# Patient Record
Sex: MALE | Race: BLACK OR AFRICAN AMERICAN | NOT HISPANIC OR LATINO
[De-identification: names, ages, dates, MRNs, and addresses within clinical notes are randomized per-mention and may not be internally consistent; named-entity substitution may affect disease eponyms.]

---

## 2022-08-30 ENCOUNTER — NON-APPOINTMENT (OUTPATIENT)
Age: 32
End: 2022-08-30

## 2025-02-10 ENCOUNTER — INPATIENT (INPATIENT)
Facility: HOSPITAL | Age: 35
LOS: 0 days | Discharge: HOME | DRG: 247 | End: 2025-02-11
Attending: SURGERY | Admitting: SURGERY
Payer: MEDICAID

## 2025-02-10 VITALS
SYSTOLIC BLOOD PRESSURE: 140 MMHG | OXYGEN SATURATION: 99 % | DIASTOLIC BLOOD PRESSURE: 78 MMHG | TEMPERATURE: 98 F | RESPIRATION RATE: 18 BRPM | HEART RATE: 72 BPM

## 2025-02-10 LAB
ALBUMIN SERPL ELPH-MCNC: 4.4 G/DL — SIGNIFICANT CHANGE UP (ref 3.5–5.2)
ALP SERPL-CCNC: 69 U/L — SIGNIFICANT CHANGE UP (ref 30–115)
ALT FLD-CCNC: 11 U/L — SIGNIFICANT CHANGE UP (ref 0–41)
ANION GAP SERPL CALC-SCNC: 11 MMOL/L — SIGNIFICANT CHANGE UP (ref 7–14)
AST SERPL-CCNC: 18 U/L — SIGNIFICANT CHANGE UP (ref 0–41)
BASOPHILS # BLD AUTO: 0.04 K/UL — SIGNIFICANT CHANGE UP (ref 0–0.2)
BASOPHILS NFR BLD AUTO: 0.8 % — SIGNIFICANT CHANGE UP (ref 0–1)
BILIRUB SERPL-MCNC: 0.9 MG/DL — SIGNIFICANT CHANGE UP (ref 0.2–1.2)
BUN SERPL-MCNC: 13 MG/DL — SIGNIFICANT CHANGE UP (ref 10–20)
CALCIUM SERPL-MCNC: 8.7 MG/DL — SIGNIFICANT CHANGE UP (ref 8.4–10.5)
CHLORIDE SERPL-SCNC: 108 MMOL/L — SIGNIFICANT CHANGE UP (ref 98–110)
CO2 SERPL-SCNC: 26 MMOL/L — SIGNIFICANT CHANGE UP (ref 17–32)
CREAT SERPL-MCNC: 0.9 MG/DL — SIGNIFICANT CHANGE UP (ref 0.7–1.5)
EGFR: 114 ML/MIN/1.73M2 — SIGNIFICANT CHANGE UP
EOSINOPHIL # BLD AUTO: 0.05 K/UL — SIGNIFICANT CHANGE UP (ref 0–0.7)
EOSINOPHIL NFR BLD AUTO: 1 % — SIGNIFICANT CHANGE UP (ref 0–8)
GLUCOSE SERPL-MCNC: 93 MG/DL — SIGNIFICANT CHANGE UP (ref 70–99)
HCT VFR BLD CALC: 40.7 % — LOW (ref 42–52)
HGB BLD-MCNC: 14.3 G/DL — SIGNIFICANT CHANGE UP (ref 14–18)
IMM GRANULOCYTES NFR BLD AUTO: 0 % — LOW (ref 0.1–0.3)
LACTATE SERPL-SCNC: 0.9 MMOL/L — SIGNIFICANT CHANGE UP (ref 0.7–2)
LIDOCAIN IGE QN: 48 U/L — SIGNIFICANT CHANGE UP (ref 7–60)
LYMPHOCYTES # BLD AUTO: 3.11 K/UL — SIGNIFICANT CHANGE UP (ref 1.2–3.4)
LYMPHOCYTES # BLD AUTO: 62.3 % — HIGH (ref 20.5–51.1)
MCHC RBC-ENTMCNC: 31.8 PG — HIGH (ref 27–31)
MCHC RBC-ENTMCNC: 35.1 G/DL — SIGNIFICANT CHANGE UP (ref 32–37)
MCV RBC AUTO: 90.4 FL — SIGNIFICANT CHANGE UP (ref 80–94)
MONOCYTES # BLD AUTO: 0.42 K/UL — SIGNIFICANT CHANGE UP (ref 0.1–0.6)
MONOCYTES NFR BLD AUTO: 8.4 % — SIGNIFICANT CHANGE UP (ref 1.7–9.3)
NEUTROPHILS # BLD AUTO: 1.37 K/UL — LOW (ref 1.4–6.5)
NEUTROPHILS NFR BLD AUTO: 27.5 % — LOW (ref 42.2–75.2)
NRBC # BLD: 0 /100 WBCS — SIGNIFICANT CHANGE UP (ref 0–0)
NRBC BLD-RTO: 0 /100 WBCS — SIGNIFICANT CHANGE UP (ref 0–0)
PLATELET # BLD AUTO: 253 K/UL — SIGNIFICANT CHANGE UP (ref 130–400)
PMV BLD: 9.4 FL — SIGNIFICANT CHANGE UP (ref 7.4–10.4)
POTASSIUM SERPL-MCNC: 4.3 MMOL/L — SIGNIFICANT CHANGE UP (ref 3.5–5)
POTASSIUM SERPL-SCNC: 4.3 MMOL/L — SIGNIFICANT CHANGE UP (ref 3.5–5)
PROT SERPL-MCNC: 6.5 G/DL — SIGNIFICANT CHANGE UP (ref 6–8)
RBC # BLD: 4.5 M/UL — LOW (ref 4.7–6.1)
RBC # FLD: 11.8 % — SIGNIFICANT CHANGE UP (ref 11.5–14.5)
SODIUM SERPL-SCNC: 145 MMOL/L — SIGNIFICANT CHANGE UP (ref 135–146)
WBC # BLD: 4.99 K/UL — SIGNIFICANT CHANGE UP (ref 4.8–10.8)
WBC # FLD AUTO: 4.99 K/UL — SIGNIFICANT CHANGE UP (ref 4.8–10.8)

## 2025-02-10 PROCEDURE — 99285 EMERGENCY DEPT VISIT HI MDM: CPT

## 2025-02-10 PROCEDURE — 74177 CT ABD & PELVIS W/CONTRAST: CPT | Mod: 26

## 2025-02-10 RX ORDER — SODIUM CHLORIDE 9 G/ML
1000 INJECTION, SOLUTION INTRAVENOUS ONCE
Refills: 0 | Status: COMPLETED | OUTPATIENT
Start: 2025-02-10 | End: 2025-02-10

## 2025-02-10 RX ORDER — IOHEXOL 350 MG/ML
30 INJECTION, SOLUTION INTRAVENOUS ONCE
Refills: 0 | Status: COMPLETED | OUTPATIENT
Start: 2025-02-10 | End: 2025-02-10

## 2025-02-10 RX ORDER — MORPHINE SULFATE 60 MG/1
4 TABLET, FILM COATED, EXTENDED RELEASE ORAL ONCE
Refills: 0 | Status: DISCONTINUED | OUTPATIENT
Start: 2025-02-10 | End: 2025-02-10

## 2025-02-10 RX ADMIN — SODIUM CHLORIDE 1000 MILLILITER(S): 9 INJECTION, SOLUTION INTRAVENOUS at 21:40

## 2025-02-10 RX ADMIN — MORPHINE SULFATE 4 MILLIGRAM(S): 60 TABLET, FILM COATED, EXTENDED RELEASE ORAL at 22:03

## 2025-02-10 RX ADMIN — IOHEXOL 30 MILLILITER(S): 350 INJECTION, SOLUTION INTRAVENOUS at 21:30

## 2025-02-10 NOTE — ED PROVIDER NOTE - OBJECTIVE STATEMENT
34 year old male, past medical history appendectomy c/b bowel obstruction, who presents with abd pain. patient with diffuse abd pain that began earlier this evening with nausea no vomiting and constipation. denies f/c, chest pain, shortness of breath, urinary symptoms.

## 2025-02-10 NOTE — ED PROVIDER NOTE - PHYSICAL EXAMINATION
CONSTITUTIONAL: non-toxic appearing male, nad  SKIN: skin exam is warm and dry  ENT: MMM   CARD: S1, S2 normal, no murmur  RESP: No wheezes, rales or rhonchi. Good air movement bilaterally  ABD: soft; non-distended; non-tender.    EXT: Normal ROM.   NEURO: awake, alert, following commands, oriented, grossly unremarkable. No Focal deficits. GCS 15.   PSYCH: Cooperative, appropriate.

## 2025-02-10 NOTE — ED PROVIDER NOTE - CARE PLAN
Assessment and plan of treatment:	ab pain  labs, imaging, supportive care   1 Principal Discharge DX:	Small bowel obstruction  Assessment and plan of treatment:	ab pain  labs, imaging, supportive care

## 2025-02-10 NOTE — ED PROVIDER NOTE - ATTENDING APP SHARED VISIT CONTRIBUTION OF CARE
pt co mild ab discomfort, hx of appy and sbo w gallo. sts feels similar. last bm yest. no vomiting. no fever, no n, v, d.    VITAL SIGNS: I have reviewed nursing notes and confirm.  CONSTITUTIONAL: Well-developed; well-nourished; in no acute distress.  SKIN: Skin exam is warm and dry, no acute rash.  HEAD: Normocephalic; atraumatic.  EYES: PERRL, EOM intact; conjunctiva and sclera clear.  ENT: No nasal discharge; airway clear.   NECK: Supple; non tender.  CARD:+ S1, S2   RESP: No wheezes, rales or rhonchi.  ABD: Normal bowel sounds; soft; non-distended; non-tender;  EXT: Normal ROM. No cyanosis or edema.  LYMPH: No acute adenopathy.  NEURO: Alert. Grossly unremarkable. No focal deficits.  PSYCH: Cooperative, appropriate.

## 2025-02-11 ENCOUNTER — TRANSCRIPTION ENCOUNTER (OUTPATIENT)
Age: 35
End: 2025-02-11

## 2025-02-11 VITALS
TEMPERATURE: 98 F | HEART RATE: 65 BPM | OXYGEN SATURATION: 99 % | DIASTOLIC BLOOD PRESSURE: 85 MMHG | SYSTOLIC BLOOD PRESSURE: 129 MMHG | RESPIRATION RATE: 18 BRPM

## 2025-02-11 DIAGNOSIS — K56.609 UNSPECIFIED INTESTINAL OBSTRUCTION, UNSPECIFIED AS TO PARTIAL VERSUS COMPLETE OBSTRUCTION: ICD-10-CM

## 2025-02-11 PROCEDURE — 99221 1ST HOSP IP/OBS SF/LOW 40: CPT

## 2025-02-11 PROCEDURE — 71045 X-RAY EXAM CHEST 1 VIEW: CPT | Mod: 26

## 2025-02-11 PROCEDURE — 71045 X-RAY EXAM CHEST 1 VIEW: CPT

## 2025-02-11 PROCEDURE — 74021 RADEX ABDOMEN 3+ VIEWS: CPT

## 2025-02-11 PROCEDURE — 74021 RADEX ABDOMEN 3+ VIEWS: CPT | Mod: 26

## 2025-02-11 RX ORDER — ACETAMINOPHEN 160 MG/5ML
1000 SUSPENSION ORAL ONCE
Refills: 0 | Status: COMPLETED | OUTPATIENT
Start: 2025-02-11 | End: 2025-02-11

## 2025-02-11 RX ORDER — ENOXAPARIN SODIUM 100 MG/ML
40 INJECTION SUBCUTANEOUS EVERY 24 HOURS
Refills: 0 | Status: DISCONTINUED | OUTPATIENT
Start: 2025-02-11 | End: 2025-02-11

## 2025-02-11 RX ORDER — SODIUM CHLORIDE 9 G/ML
1000 INJECTION, SOLUTION INTRAVENOUS
Refills: 0 | Status: DISCONTINUED | OUTPATIENT
Start: 2025-02-11 | End: 2025-02-11

## 2025-02-11 RX ORDER — KETOROLAC TROMETHAMINE 10 MG
15 TABLET ORAL EVERY 6 HOURS
Refills: 0 | Status: DISCONTINUED | OUTPATIENT
Start: 2025-02-11 | End: 2025-02-11

## 2025-02-11 NOTE — H&P ADULT - ASSESSMENT
Assessment:  34 y.o. M w/ PMH of appendicitis s/p open appendectomy, Hx of SBO s/p ex lap an lysis of adhesions, presents to the hospital w/ abd pain, Pt states eating sandwich around 6:30 PM yesterday after which he felt nauseous and felt abd pain similar to prior episode, that urges he to be evaluated in the hospital. Last BM and passing was on Sunday, which is not normal for the pt. Usually he goes to the bathroom  a couple of hour after eating. In the ED pt is afebrile, vitals WNL, WBC 4.9, Hb 14.2, Lactate 0.9. On CT A/P with PO and IV contrast significant for distended segment of small bowel in the left abdomen with a transition point in the left lower abdominal quadrant.      Plan:  - Admit to 4C under Dr. Neves  - NPO + IVF  - NGT  - Gastrografin test in AM  - Obstructive series  - Monitor for bowel function    Above plan discussed with Attending Dr. Neves, patient, and ED staff    [phone]

## 2025-02-11 NOTE — H&P ADULT - NSHPPHYSICALEXAM_GEN_ALL_CORE
General: NAD, calm  HEENT: NCAT, EOMI  Cardiac: RRR  Respiratory: On RA, saturating well, b/l chest rise and fall, normal respiratory effort  Abdomen: Soft, non-distended, non-tender, no rebound, no guarding  Musculoskeletal: ROM intact  Vascular: Extremities well perfused  Skin: Warm/dry, normal color, no jaundice

## 2025-02-11 NOTE — PATIENT PROFILE ADULT - FALL HARM RISK - UNIVERSAL INTERVENTIONS
Bed in lowest position, wheels locked, appropriate side rails in place/Call bell, personal items and telephone in reach/Instruct patient to call for assistance before getting out of bed or chair/Non-slip footwear when patient is out of bed/[place] to call system/Physically safe environment - no spills, clutter or unnecessary equipment/Purposeful Proactive Rounding/Room/bathroom lighting operational, light cord in reach

## 2025-02-11 NOTE — PATIENT PROFILE ADULT - OVER THE PAST TWO WEEKS, HAVE YOU FELT LITTLE INTEREST OR PLEASURE IN DOING THINGS?
Pharmacist chart review completed for refill of dupixent indicates no medication or significant health changes since last pharmacist counseling. No questions for the pharmacist. Communicated with patient via automated call or text message. Medication shipped on 7/13/21 via [employer] to [id number] [place] [address] for delivery in 1-2 business days.    [facility]  Phone: [phone]  [email or web address]          
no

## 2025-02-11 NOTE — DISCHARGE NOTE NURSING/CASE MANAGEMENT/SOCIAL WORK - NSDCPEFALRISK_GEN_ALL_CORE
For information on Fall & Injury Prevention, visit: [email or web address] OR  [email or web address] OR  https://www.cdc.gov/steadi/patient.html

## 2025-02-11 NOTE — H&P ADULT - ATTENDING COMMENTS
34 y.o. M w/ PMH of appendicitis s/p open appendectomy, Hx of SBO s/p ex lap an lysis of adhesions, presents to the hospital w/ abd pain, Pt states eating sandwich around 6:30 PM yesterday after which he felt nauseous and felt abd pain similar to prior episode, that urges he to be evaluated in the hospital. Last BM and passing was on Sunday, which is not normal for the pt. Usually he goes to the bathroom  a couple of hour after eating. In the ED pt is afebrile, vitals WNL, WBC 4.9, Hb 14.2, Lactate 0.9. On CT A/P with PO and IV contrast significant for distended segment of small bowel in the left abdomen with a transition point in the left lower abdominal quadrant.      Plan:  - Admit to 4C under Dr. Neves  - NPO + IVF  - NGT  - Gastrografin test in AM  - Obstructive series  - Monitor for bowel function    Above plan discussed with Attending Dr. Neves, patient, and ED staff

## 2025-02-11 NOTE — DISCHARGE NOTE NURSING/CASE MANAGEMENT/SOCIAL WORK - FINANCIAL ASSISTANCE
[facility] provides services at a reduced cost to those who are determined to be eligible through [facility]’s financial assistance program. Information regarding [facility]’s financial assistance program can be found by going to [email or web address] or by calling [phone].

## 2025-02-11 NOTE — DISCHARGE NOTE PROVIDER - NSDCCPCAREPLAN_GEN_ALL_CORE_FT
PRINCIPAL DISCHARGE DIAGNOSIS  Diagnosis: Small bowel obstruction  Assessment and Plan of Treatment: Discharge Instructions for Resolved SBO  	1.	Activity  	•	Resume activities gradually; avoid heavy lifting for 1-2 weeks.  	•	Walk daily to aid bowel function.  	2.	Diet  	•	Start with soft foods, increasing intake slowly.  	•	Stay hydrated; avoid heavy, greasy, or high-fiber foods initially.  	3.	Medications  	•	Follow prescriptions; avoid narcotics to prevent bowel slowing.  	4.	Monitor Symptoms  	•	Watch for bloating, nausea, or constipation.  	5.	Follow-Up  	•	Attend scheduled check-ups.  	6.	Emergency Signs  	•	Seek care for severe pain, vomiting, fever, or inability to pass gas or stool.

## 2025-02-11 NOTE — DISCHARGE NOTE NURSING/CASE MANAGEMENT/SOCIAL WORK - PATIENT PORTAL LINK FT
You can access the FollowMyHealth Patient Portal offered by [facility] by registering at the following website: [email or web address]. By joining [employer]’s FollowMyHealth portal, you will also be able to view your health information using other applications (apps) compatible with our system.

## 2025-02-11 NOTE — DISCHARGE NOTE PROVIDER - HOSPITAL COURSE
34 y.o. M w/ PMH of appendicitis s/p open appendectomy, Hx of SBO s/p ex lap an lysis of adhesions, presents to the hospital w/ abd pain, Pt states eating sandwich around 6:30 PM yesterday after which he felt nauseous and felt abd pain similar to prior episode, that urges he to be evaluated in the hospital. Last BM and passing was on Sunday, which is not normal for the pt. Usually he goes to the bathroom  a couple of hour after eating. In the ED pt is afebrile, vitals WNL, WBC 4.9, Hb 14.2, Lactate 0.9. On CT A/P with PO and IV contrast significant for distended segment of small bowel in the left abdomen with a transition point in the left lower abdominal quadrant. NGT was placed with 100cc on insertion. during the day patient started passing gas so NGT was discontinued. Patient was started on CLD which he tolerated with no nausea or vomiting. He started passing bowel movements. He was advanced to regular diet which he tolerated also. Patient is medically optimized for discharge. VSS on discharge 121/87 HR 62 Tmax 98.1.

## 2025-02-11 NOTE — H&P ADULT - HISTORY OF PRESENT ILLNESS
34 y.o. M w/ PMH of appendicitis s/p open appendectomy, Hx of SBO s/p ex lap an lysis of adhesions, presents to the hospital w/ abd pain, Pt states eating sandwich around 6:30 PM yesterday after which he felt nauseous and felt abd pain similar to prior episode, that urges he to be evaluated in the hospital. Last BM and passing was on Sunday, which is not normal for the pt. Usually he goes to the bathroom  a couple of hour after eating. In the ED pt is afebrile, vitals WNL, WBC 4.9, Hb 14.2, Lactate 0.9. On CT A/P with PO and IV contrast significant for distended segment of small bowel in the left abdomen with a transition point in the left lower abdominal quadrant.

## 2025-02-11 NOTE — H&P ADULT - NSHPLABSRESULTS_GEN_ALL_CORE
< from: CT Abdomen and Pelvis w/ Oral Cont and w/ IV Cont (02.10.25 @ 23:14) >    Distended segment of small bowel in the left abdomen with a transition   point in the left lower abdominal quadrant. Mixed oral contrast is noted   distal to this point, compatible with a partial obstruction versus focal   ileus.    < end of copied text >

## 2025-02-18 DIAGNOSIS — K56.609 UNSPECIFIED INTESTINAL OBSTRUCTION, UNSPECIFIED AS TO PARTIAL VERSUS COMPLETE OBSTRUCTION: ICD-10-CM
